# Patient Record
Sex: FEMALE | Race: ASIAN | NOT HISPANIC OR LATINO | URBAN - METROPOLITAN AREA
[De-identification: names, ages, dates, MRNs, and addresses within clinical notes are randomized per-mention and may not be internally consistent; named-entity substitution may affect disease eponyms.]

---

## 2021-07-08 ENCOUNTER — INPATIENT (INPATIENT)
Facility: HOSPITAL | Age: 41
LOS: 0 days | Discharge: HOME | End: 2021-07-09
Attending: INTERNAL MEDICINE | Admitting: INTERNAL MEDICINE
Payer: COMMERCIAL

## 2021-07-08 VITALS
DIASTOLIC BLOOD PRESSURE: 103 MMHG | RESPIRATION RATE: 17 BRPM | OXYGEN SATURATION: 99 % | HEIGHT: 63 IN | TEMPERATURE: 98 F | HEART RATE: 127 BPM | WEIGHT: 125 LBS | SYSTOLIC BLOOD PRESSURE: 157 MMHG

## 2021-07-08 LAB
ALBUMIN SERPL ELPH-MCNC: 4.5 G/DL — SIGNIFICANT CHANGE UP (ref 3.3–5)
ALP SERPL-CCNC: 126 U/L — HIGH (ref 40–120)
ALT FLD-CCNC: 38 U/L — SIGNIFICANT CHANGE UP (ref 12–78)
ANION GAP SERPL CALC-SCNC: 10 MMOL/L — SIGNIFICANT CHANGE UP (ref 5–17)
AST SERPL-CCNC: 19 U/L — SIGNIFICANT CHANGE UP (ref 15–37)
BASOPHILS # BLD AUTO: 0.03 K/UL — SIGNIFICANT CHANGE UP (ref 0–0.2)
BASOPHILS NFR BLD AUTO: 0.3 % — SIGNIFICANT CHANGE UP (ref 0–2)
BILIRUB SERPL-MCNC: 0.3 MG/DL — SIGNIFICANT CHANGE UP (ref 0.2–1.2)
BUN SERPL-MCNC: 9 MG/DL — SIGNIFICANT CHANGE UP (ref 7–23)
CALCIUM SERPL-MCNC: 9.2 MG/DL — SIGNIFICANT CHANGE UP (ref 8.5–10.1)
CHLORIDE SERPL-SCNC: 106 MMOL/L — SIGNIFICANT CHANGE UP (ref 96–108)
CO2 SERPL-SCNC: 24 MMOL/L — SIGNIFICANT CHANGE UP (ref 22–31)
CREAT SERPL-MCNC: 0.7 MG/DL — SIGNIFICANT CHANGE UP (ref 0.5–1.3)
D DIMER BLD IA.RAPID-MCNC: 200 NG/ML DDU — SIGNIFICANT CHANGE UP
EOSINOPHIL # BLD AUTO: 0.11 K/UL — SIGNIFICANT CHANGE UP (ref 0–0.5)
EOSINOPHIL NFR BLD AUTO: 1.1 % — SIGNIFICANT CHANGE UP (ref 0–6)
FLUAV AG NPH QL: SIGNIFICANT CHANGE UP
FLUBV AG NPH QL: SIGNIFICANT CHANGE UP
GLUCOSE SERPL-MCNC: 130 MG/DL — HIGH (ref 70–99)
HCG SERPL-ACNC: <1 MIU/ML — SIGNIFICANT CHANGE UP
HCT VFR BLD CALC: 43.3 % — SIGNIFICANT CHANGE UP (ref 34.5–45)
HGB BLD-MCNC: 13.4 G/DL — SIGNIFICANT CHANGE UP (ref 11.5–15.5)
IMM GRANULOCYTES NFR BLD AUTO: 0.2 % — SIGNIFICANT CHANGE UP (ref 0–1.5)
LYMPHOCYTES # BLD AUTO: 3.42 K/UL — HIGH (ref 1–3.3)
LYMPHOCYTES # BLD AUTO: 33 % — SIGNIFICANT CHANGE UP (ref 13–44)
MAGNESIUM SERPL-MCNC: 2.6 MG/DL — SIGNIFICANT CHANGE UP (ref 1.6–2.6)
MCHC RBC-ENTMCNC: 25.2 PG — LOW (ref 27–34)
MCHC RBC-ENTMCNC: 30.9 GM/DL — LOW (ref 32–36)
MCV RBC AUTO: 81.5 FL — SIGNIFICANT CHANGE UP (ref 80–100)
MONOCYTES # BLD AUTO: 0.73 K/UL — SIGNIFICANT CHANGE UP (ref 0–0.9)
MONOCYTES NFR BLD AUTO: 7 % — SIGNIFICANT CHANGE UP (ref 2–14)
NEUTROPHILS # BLD AUTO: 6.05 K/UL — SIGNIFICANT CHANGE UP (ref 1.8–7.4)
NEUTROPHILS NFR BLD AUTO: 58.4 % — SIGNIFICANT CHANGE UP (ref 43–77)
NRBC # BLD: 0 /100 WBCS — SIGNIFICANT CHANGE UP (ref 0–0)
PLATELET # BLD AUTO: 541 K/UL — HIGH (ref 150–400)
POTASSIUM SERPL-MCNC: 3.4 MMOL/L — LOW (ref 3.5–5.3)
POTASSIUM SERPL-SCNC: 3.4 MMOL/L — LOW (ref 3.5–5.3)
PROT SERPL-MCNC: 10.2 GM/DL — HIGH (ref 6–8.3)
RBC # BLD: 5.31 M/UL — HIGH (ref 3.8–5.2)
RBC # FLD: 15.6 % — HIGH (ref 10.3–14.5)
SARS-COV-2 RNA SPEC QL NAA+PROBE: SIGNIFICANT CHANGE UP
SODIUM SERPL-SCNC: 140 MMOL/L — SIGNIFICANT CHANGE UP (ref 135–145)
TROPONIN I SERPL-MCNC: <.015 NG/ML — SIGNIFICANT CHANGE UP (ref 0.01–0.04)
TSH SERPL-MCNC: 2.84 UU/ML — SIGNIFICANT CHANGE UP (ref 0.36–3.74)
WBC # BLD: 10.36 K/UL — SIGNIFICANT CHANGE UP (ref 3.8–10.5)
WBC # FLD AUTO: 10.36 K/UL — SIGNIFICANT CHANGE UP (ref 3.8–10.5)

## 2021-07-08 PROCEDURE — 99232 SBSQ HOSP IP/OBS MODERATE 35: CPT

## 2021-07-08 PROCEDURE — 99222 1ST HOSP IP/OBS MODERATE 55: CPT

## 2021-07-08 PROCEDURE — 93308 TTE F-UP OR LMTD: CPT | Mod: 26

## 2021-07-08 PROCEDURE — 99285 EMERGENCY DEPT VISIT HI MDM: CPT

## 2021-07-08 PROCEDURE — 99223 1ST HOSP IP/OBS HIGH 75: CPT | Mod: 25

## 2021-07-08 PROCEDURE — 93010 ELECTROCARDIOGRAM REPORT: CPT

## 2021-07-08 RX ORDER — SODIUM CHLORIDE 9 MG/ML
1000 INJECTION INTRAMUSCULAR; INTRAVENOUS; SUBCUTANEOUS ONCE
Refills: 0 | Status: COMPLETED | OUTPATIENT
Start: 2021-07-08 | End: 2021-07-08

## 2021-07-08 RX ORDER — METOPROLOL TARTRATE 50 MG
25 TABLET ORAL DAILY
Refills: 0 | Status: DISCONTINUED | OUTPATIENT
Start: 2021-07-08 | End: 2021-07-08

## 2021-07-08 RX ORDER — POTASSIUM CHLORIDE 20 MEQ
40 PACKET (EA) ORAL ONCE
Refills: 0 | Status: COMPLETED | OUTPATIENT
Start: 2021-07-08 | End: 2021-07-08

## 2021-07-08 RX ORDER — METOPROLOL TARTRATE 50 MG
5 TABLET ORAL ONCE
Refills: 0 | Status: COMPLETED | OUTPATIENT
Start: 2021-07-08 | End: 2021-07-08

## 2021-07-08 RX ORDER — METOPROLOL TARTRATE 50 MG
50 TABLET ORAL DAILY
Refills: 0 | Status: DISCONTINUED | OUTPATIENT
Start: 2021-07-08 | End: 2021-07-08

## 2021-07-08 RX ORDER — METOPROLOL TARTRATE 50 MG
50 TABLET ORAL ONCE
Refills: 0 | Status: COMPLETED | OUTPATIENT
Start: 2021-07-08 | End: 2021-07-08

## 2021-07-08 RX ORDER — METOPROLOL TARTRATE 50 MG
50 TABLET ORAL
Refills: 0 | Status: DISCONTINUED | OUTPATIENT
Start: 2021-07-08 | End: 2021-07-09

## 2021-07-08 RX ADMIN — SODIUM CHLORIDE 1000 MILLILITER(S): 9 INJECTION INTRAMUSCULAR; INTRAVENOUS; SUBCUTANEOUS at 14:01

## 2021-07-08 RX ADMIN — Medication 5 MILLIGRAM(S): at 14:04

## 2021-07-08 RX ADMIN — Medication 50 MILLIGRAM(S): at 16:49

## 2021-07-08 RX ADMIN — Medication 40 MILLIEQUIVALENT(S): at 14:01

## 2021-07-08 RX ADMIN — SODIUM CHLORIDE 1000 MILLILITER(S): 9 INJECTION INTRAMUSCULAR; INTRAVENOUS; SUBCUTANEOUS at 13:17

## 2021-07-08 NOTE — CONSULT NOTE ADULT - ASSESSMENT
40F HTN who presents with pSVT.    Short R-P tachycardia, likely AVNRT, precipitating factors include tachycardia. unclear on telemetry if episode preceded by PAC.  Strips with adenosine treatment not immediately available. unknown if truly adenosine-responsive.  No evidence of CHF on exam.    BP elevated, possibly 2/2 adrenergic tone in setting of SVT    -start metoprolol tartrate 50mg BID for rate control. we reviewed potential side effects.  -check TTE to eval for structural heart disease.  -we reviewed vagal maneuvers should symptoms recur. should medical management be insufficient or not well tolerated, we discussed undergoing evaluation for ablation.  -if no further recurrence in the next day she can be discharged from a cardiac standpoint      Naveed Gibson MD, FACC  , [facility] [employer] at [facility] [place] [facility]  [address] [place] [address], [place] [address]  office: [phone]

## 2021-07-08 NOTE — H&P ADULT - HISTORY OF PRESENT ILLNESS
40F    hx   HTN        p/w palpitations a few hours pta.        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 had  no sob       pt  went to . and   then ems  was  called , pt was   given adenosine iv 6mg for a HR of 180        pt denies  fevers,  abd pain, vomiting,  numbness      While in er, pt had  2  more episodes  of palpitations, hence  admitted  by Er

## 2021-07-08 NOTE — H&P ADULT - NSHPREVIEWOFSYSTEMS_GEN_ALL_CORE
REVIEW OF SYSTEMS:  GEN: no fever,    no chills  RESP: no SOB,   no cough  CVS: no chest pain,    palpitations  GI: no abdominal pain,   no nausea,   no vomiting,   no constipation,   no diarrhea  : no dysuria,   no frequency  NEURO: no headache,   no dizziness  PSYCH: no depression,   not anxious  Derm : no rash

## 2021-07-08 NOTE — ED ADULT NURSE NOTE - CHIEF COMPLAINT QUOTE
41 y/o female with PMH of HTN. BIBA with c/c of Palpitations. 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 PT was given 6mg of adenosine and broke in 135.

## 2021-07-08 NOTE — ED PROVIDER NOTE - CLINICAL SUMMARY MEDICAL DECISION MAKING FREE TEXT BOX
svt, broken with adenosine. check labs. rule out pe as she is  svt, broken with adenosine. check labs. rule out pe as she is   I read ekg as sinus tach rate 133, narrow qrs, normal axis, no st elevation or depression, qtc 437.

## 2021-07-08 NOTE — ED PROVIDER NOTE - OBJECTIVE STATEMENT
40F hx htn pw palpitations a few hours pta. 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 mild cp centrally, didn't radiate. no sob. called ems, given adenosine iv 6mg which broke her. ros neg for ha, vision loss, rhinorrhea, rash, bleeding, abd pain, vomiting, fever, chills, numbness, pregnancy, dysuria. feels fine now. 40F hx htn pw palpitations a few hours pta. 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 mild cp centrally, didn't radiate. no sob. went to . called ems, given adenosine iv 6mg for a HR of 180, which broke her. ros neg for ha, vision loss, rhinorrhea, rash, bleeding, abd pain, vomiting, fever, chills, numbness, pregnancy, dysuria. feels fine now.

## 2021-07-08 NOTE — H&P ADULT - ASSESSMENT
40 yr f,        hx   HTN        p/w palpitations a few hours pta.         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 had  no sob        pt  went to . and   then ems  was  called , pt was   given adenosine iv 6mg for a HR of 180        pt denies  fevers,  abd pain, vomiting,  numbness       While in er, pt had  2  more episodes  of palpitations, hence  admitted         *  s/p SVT          was  given adenosine  by ems          tele         echo/  card evtin bruno          *   HTN          on  toprol          labs/  tsh  in am   *    Hypokalemia            dvt ppx/   teds             40 yr f,        hx   HTN        p/w palpitations a few hours pta.         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 had  no sob        pt  went to . and   then ems  was  called , pt was   given adenosine iv 6mg for a HR of 180        pt denies  fevers,  abd pain, vomiting,  numbness       While in er, pt had  2  more episodes  of palpitations, hence  admitted         *  s/p SVT          was  given adenosine  by ems          tele  monitoring          troponin is normal/   d  dimer  is normal           and, tsh is normal         echo/  card eval dr bruno          *   HTN          on  toprol            *    Hypokalemia            dvt ppx/   teds             40 yr f,        hx   HTN        p/w palpitations a few hours pta.         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 had  no sob        pt  went to . and   then ems  was  called , pt was   given adenosine iv 6mg for a HR of 180        pt denies  fevers,  abd pain, vomiting,  numbness       While in er, pt had  2  more episodes  of palpitations, hence  admitted         *  s/p SVT/  AVNRT          was  given adenosine  by ems          tele  monitoring          troponin is normal/   d  dimer  is normal           and, tsh is normal         echo/  card eval dr bruno         on lopressor   bid,   *   HTN          on lopressor now/  stopped  norvasc            *    Hypokalemia            dvt ppx/   teds   plan.  change   to  toprol  XL  in  am  and  d/c  pt

## 2021-07-08 NOTE — H&P ADULT - NSHPPHYSICALEXAM_GEN_ALL_CORE
PHYSICAL EXAMINATION:  Vital Signs Last 24 Hrs  T(C): 36.9 (08 Jul 2021 12:42), Max: 36.9 (08 Jul 2021 12:42)  T(F): 98.5 (08 Jul 2021 12:42), Max: 98.5 (08 Jul 2021 12:42)  HR: 127 (08 Jul 2021 12:42) (127 - 127)  BP: 157/103 (08 Jul 2021 12:42) (157/103 - 157/103)  BP(mean): --  RR: 17 (08 Jul 2021 12:42) (17 - 17)  SpO2: 99% (08 Jul 2021 12:42) (99% - 99%)  CAPILLARY BLOOD GLUCOSE            GENERAL: NAD, well-groomed,  HEAD:  atraumatic, normocephalic  EYES: sclera anicteric  ENMT: mucous membranes moist  NECK: supple, No JVD  CHEST/LUNG: clear to auscultation bilaterally;    no      rales   ,   no rhonchi,   HEART: normal S1, S2  ABDOMEN: BS+, soft, ND, NT   EXTREMITIES:    no    edema    b/l LEs  NEURO: awake, ,     moves all extremities  SKIN: no     rash

## 2021-07-08 NOTE — ED ADULT TRIAGE NOTE - CHIEF COMPLAINT QUOTE
41 y/o female with PMH of HTN. BIBA with c/c of Palpitations. 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 PT was given 6mg of adenosine and broke in 135.

## 2021-07-08 NOTE — H&P ADULT - NSHPLABSRESULTS_GEN_ALL_CORE
LABS:                        13.4   10.36 )-----------( 541      ( 08 Jul 2021 13:08 )             43.3     07-08    140  |  106  |  9   ----------------------------<  130<H>  3.4<L>   |  24  |  0.70    Ca    9.2      08 Jul 2021 13:08  Mg     2.6     07-08    TPro  10.2<H>  /  Alb  4.5  /  TBili  0.3  /  DBili  x   /  AST  19  /  ALT  38  /  AlkPhos  126<H>  07-08      CARDIAC MARKERS ( 08 Jul 2021 13:08 )  <.015 ng/mL / x     / x     / x     / x                    Thyroid Stimulating Hormone, Serum: 2.840 uU/mL (07-08 @ 13:08)

## 2021-07-08 NOTE — ED ADULT NURSE NOTE - OBJECTIVE STATEMENT
40 year old female c/o palpitations starting today while driving. Patient denies any SOB or dizziness. Came in by ambulance whom gave her 6mg of adenosine IV for increased . PMH HTN

## 2021-07-08 NOTE — CONSULT NOTE ADULT - SUBJECTIVE AND OBJECTIVE BOX
Cardiology Initial Consult    [facility] [place]  [address] [place] [address], [place] [address]  Office: [phone]  Fax: [phone]    CHIEF COMPLAINT:      HISTORY OF PRESENT ILLNESS:  40yFemale    Allergies    No Known Allergies    Intolerances    	    MEDICATIONS:  metoprolol tartrate 50 milliGRAM(s) Oral once                  PAST MEDICAL & SURGICAL HISTORY:  HTN (hypertension)    No significant past surgical history        FAMILY HISTORY:      SOCIAL HISTORY:    [ ] Non-smoker  [ ] Smoker  [ ] Alcohol    Review of Systems:  Constitutional: [ ] Fever [ ] Chills [ ] Fatigue [ ] Weight change   HEENT: [ ] Blurred vision [ ] Eye pain [ ] Headache [ ] Runny nose [ ] Sore throat   Respiratory: [ ] Cough [ ] Wheezing [ ] Shortness of breath  Cardiovascular: [ ] Chest Pain [ ] Palpitations [ ] BHAT [ ] PND [ ] Orthopnea  Gastrointestinal: [ ] Abdominal Pain [ ] Diarrhea [ ] Constipation [ ] Hemorrhoids [ ] Nausea [ ] Vomiting  Genitourinary: [ ] Nocturia [ ] Dysuria [ ] Incontinence  Extremities: [ ] Swelling [ ] Joint Pain  Neurologic: [ ] Focal deficit [ ] Paresthesias [ ] Syncope  Skin: [ ] Rash [ ] Ecchymoses [ ] Wounds [ ] Lesions  Psychiatry: [ ] Depression [ ] Suicidal/Homicidal ideation [ ] Anxiety [ ] Sleep disturbances  [ ] 10 point review of systems is otherwise negative except as mentioned above            [ ]Unable to obtain    PHYSICAL EXAM:  T(C): 36.9 (07-08-21 @ 12:42), Max: 36.9 (07-08-21 @ 12:42)  HR: 127 (07-08-21 @ 12:42) (127 - 127)  BP: 157/103 (07-08-21 @ 12:42) (157/103 - 157/103)  RR: 17 (07-08-21 @ 12:42) (17 - 17)  SpO2: 99% (07-08-21 @ 12:42) (99% - 99%)  Wt(kg): --  I&O's Summary      Appearance: No acute distress  HEENT:   Normal oral mucosa, PERRL  Cardiovascular: Normal S1 S2, no elevated JVP, no murmurs, no edema  Respiratory: Lungs clear to auscultation	, good air movement  Psychiatry: A & O x 3, Mood & affect appropriate  Gastrointestinal:  soft nt nd normoactive bs	  Skin: No rashes, no ecchymoses, no cyanosis	  Neurologic: grossly non-focal  Extremities: Normal range of motion, no clubbing, cyanosis or edema  Vascular: Peripheral pulses palpable bilaterally    LABS:	 	  CBC Full  -  ( 08 Jul 2021 13:08 )  WBC Count : 10.36 K/uL  Hemoglobin : 13.4 g/dL  Hematocrit : 43.3 %  Platelet Count - Automated : 541 K/uL  Mean Cell Volume : 81.5 fl  Mean Cell Hemoglobin : 25.2 pg  Mean Cell Hemoglobin Concentration : 30.9 gm/dL  Auto Neutrophil # : 6.05 K/uL  Auto Lymphocyte # : 3.42 K/uL  Auto Monocyte # : 0.73 K/uL  Auto Eosinophil # : 0.11 K/uL  Auto Basophil # : 0.03 K/uL  Auto Neutrophil % : 58.4 %  Auto Lymphocyte % : 33.0 %  Auto Monocyte % : 7.0 %  Auto Eosinophil % : 1.1 %  Auto Basophil % : 0.3 %    07-08    140  |  106  |  9   ----------------------------<  130<H>  3.4<L>   |  24  |  0.70    Ca    9.2      08 Jul 2021 13:08  Mg     2.6     07-08    TPro  10.2<H>  /  Alb  4.5  /  TBili  0.3  /  DBili  x   /  AST  19  /  ALT  38  /  AlkPhos  126<H>  07-08      proBNP:   Lipid Profile:   HgA1c:   TSH: Thyroid Stimulating Hormone, Serum: 2.840 uU/mL (07-08 @ 13:08)      CARDIAC MARKERS:  Troponin I, Serum: <.015 ng/mL (07-08 @ 13:08)              TELEMETRY: 	    ECG:  	  RADIOLOGY:  OTHER: 	    PREVIOUS DIAGNOSTIC TESTING:    [ ] Echocardiogram:   [ ] Catheterization:   [ ] Stress Test:  	 Cardiology Initial Consult    [facility] [place]  [address] [place] [address], [place] [address]  Office: [phone]  Fax: [phone]    CHIEF COMPLAINT:  palpitations    HISTORY OF PRESENT ILLNESS:  40F HTN who experienced palpitations while seated after eating breakfast associated with chest pressure, ent to  and found to have SVT. Broke with adenosine.    Repeat episode in the ED, broke with vagal maneuvers. Received 5mg IV metoprolol.    Denies LOC, dizziness, visual disturbances, orthopnea, LE edema.  No clear inciting factor. Drinks tea in the AM.  No other sympathomimetics.  no etoh, tobacco, herbal supplements    Takes amlodipine for BP      Allergies  No Known Allergies    Intolerances      MEDICATIONS:  metoprolol tartrate 50 milliGRAM(s) Oral once          PAST MEDICAL & SURGICAL HISTORY:  HTN (hypertension)    No significant past surgical history        FAMILY HISTORY:  no premature CAD or SCD    SOCIAL HISTORY:    [x] Non-smoker  [ ] Smoker  [ ] Alcohol    Review of Systems:  Constitutional: [-] Fever [ -] Chills [ ] Fatigue [ ] Weight change   HEENT: [ ] Blurred vision [- ] Eye pain [- ] Headache [ ] Runny nose [ ] Sore throat   Respiratory: [ ] Cough [ ] Wheezing [- ] Shortness of breath  Cardiovascular: [x ] Chest Pain [x ] Palpitations [- ] BHAT [- ] PND [ -] Orthopnea  Gastrointestinal: [ ] Abdominal Pain [ ] Diarrhea [ ] Constipation [ ] Hemorrhoids [- ] Nausea [- ] Vomiting  Genitourinary: [ ] Nocturia [- ] Dysuria [ ] Incontinence  Extremities: [- ] Swelling [ ] Joint Pain  Neurologic: [ ] Focal deficit [ ] Paresthesias [- ] Syncope  Skin: [-] Rash [ ] Ecchymoses [ ] Wounds [ ] Lesions  Psychiatry: [ -] Depression [ ] Suicidal/Homicidal ideation [ ] Anxiety [ ] Sleep disturbances  [- ] 10 point review of systems is otherwise negative except as mentioned above            [ ]Unable to obtain    PHYSICAL EXAM:  T(C): 36.9 (07-08-21 @ 12:42), Max: 36.9 (07-08-21 @ 12:42)  HR: 127 (07-08-21 @ 12:42) (127 - 127)  BP: 157/103 (07-08-21 @ 12:42) (157/103 - 157/103)  RR: 17 (07-08-21 @ 12:42) (17 - 17)  SpO2: 99% (07-08-21 @ 12:42) (99% - 99%)  Wt(kg): --  I&O's Summary    Appearance: No acute distress  HEENT:   mmm  Cardiovascular: Normal S1 S2, tachycardic, no elevated JVP, no murmurs, no edema  Respiratory: Lungs clear to auscultation	, good air movement  Psychiatry: A & O x 3, Mood & affect appropriate  Gastrointestinal:  soft nt nd  Skin: No rashes, no ecchymoses, no cyanosis	  Neurologic: grossly non-focal  Extremities: Normal range of motion, no clubbing, cyanosis or edema  Vascular: Peripheral pulses palpable bilaterally    LABS:	 	  CBC Full  -  ( 08 Jul 2021 13:08 )  WBC Count : 10.36 K/uL  Hemoglobin : 13.4 g/dL  Hematocrit : 43.3 %  Platelet Count - Automated : 541 K/uL    07-08  140  |  106  |  9   ----------------------------<  130<H>  3.4<L>   |  24  |  0.70    Ca    9.2      08 Jul 2021 13:08  Mg     2.6     07-08    TPro  10.2<H>  /  Alb  4.5  /  TBili  0.3  /  DBili  x   /  AST  19  /  ALT  38  /  AlkPhos  126<H>  07-08      proBNP:   Lipid Profile:   HgA1c:   TSH: Thyroid Stimulating Hormone, Serum: 2.840 uU/mL (07-08 @ 13:08)      CARDIAC MARKERS:  Troponin I, Serum: <.015 ng/mL (07-08 @ 13:08)    TELEMETRY: 	  ST --> SVT  ECG:  	ST, no ischemic changes. EMS ECG with short-RP tachycardia, sub-mm ST depressions  RADIOLOGY:  OTHER: 	    PREVIOUS DIAGNOSTIC TESTING:    [ ] Echocardiogram:   [ ] Catheterization:   [ ] Stress Test:

## 2021-07-08 NOTE — ED PROVIDER NOTE - PHYSICAL EXAMINATION
Gen: Alert, NAD  Head: NC, AT   Eyes: PERRL, EOMI, normal lids/conjunctiva  ENT: normal hearing, patent oropharynx without erythema/exudate, uvula midline  Neck: supple, no tenderness, Trachea midline  Pulm: Bilateral BS, normal resp effort, no wheeze/stridor/retractions  CV: tachy no M/R/G, 2+ radial and dp pulses bl, no edema  Abd: soft, NT/ND, +BS, no hepatosplenomegaly  Mskel: extremities x4 with normal ROM and no joint effusions. no ctl spine ttp.   Skin: no rash, no bruising   Neuro: AAOx3, no sensory/motor deficits, CN 2-12 intact

## 2021-07-09 VITALS
OXYGEN SATURATION: 99 % | HEART RATE: 67 BPM | DIASTOLIC BLOOD PRESSURE: 70 MMHG | RESPIRATION RATE: 17 BRPM | TEMPERATURE: 97 F | SYSTOLIC BLOOD PRESSURE: 155 MMHG

## 2021-07-09 LAB
ANION GAP SERPL CALC-SCNC: 7 MMOL/L — SIGNIFICANT CHANGE UP (ref 5–17)
BUN SERPL-MCNC: 7 MG/DL — SIGNIFICANT CHANGE UP (ref 7–23)
CALCIUM SERPL-MCNC: 8.7 MG/DL — SIGNIFICANT CHANGE UP (ref 8.5–10.1)
CHLORIDE SERPL-SCNC: 108 MMOL/L — SIGNIFICANT CHANGE UP (ref 96–108)
CO2 SERPL-SCNC: 23 MMOL/L — SIGNIFICANT CHANGE UP (ref 22–31)
COVID-19 SPIKE DOMAIN AB INTERP: POSITIVE
COVID-19 SPIKE DOMAIN ANTIBODY RESULT: >250 U/ML — HIGH
CREAT SERPL-MCNC: 0.56 MG/DL — SIGNIFICANT CHANGE UP (ref 0.5–1.3)
GLUCOSE SERPL-MCNC: 128 MG/DL — HIGH (ref 70–99)
HCT VFR BLD CALC: 38.8 % — SIGNIFICANT CHANGE UP (ref 34.5–45)
HGB BLD-MCNC: 12 G/DL — SIGNIFICANT CHANGE UP (ref 11.5–15.5)
MCHC RBC-ENTMCNC: 25.1 PG — LOW (ref 27–34)
MCHC RBC-ENTMCNC: 30.9 GM/DL — LOW (ref 32–36)
MCV RBC AUTO: 81 FL — SIGNIFICANT CHANGE UP (ref 80–100)
NRBC # BLD: 0 /100 WBCS — SIGNIFICANT CHANGE UP (ref 0–0)
PLATELET # BLD AUTO: 521 K/UL — HIGH (ref 150–400)
POTASSIUM SERPL-MCNC: 3.7 MMOL/L — SIGNIFICANT CHANGE UP (ref 3.5–5.3)
POTASSIUM SERPL-SCNC: 3.7 MMOL/L — SIGNIFICANT CHANGE UP (ref 3.5–5.3)
PROT SERPL-MCNC: 8.2 G/DL — SIGNIFICANT CHANGE UP (ref 6–8.3)
PROT SERPL-MCNC: 8.2 G/DL — SIGNIFICANT CHANGE UP (ref 6–8.3)
RBC # BLD: 4.79 M/UL — SIGNIFICANT CHANGE UP (ref 3.8–5.2)
RBC # FLD: 15.9 % — HIGH (ref 10.3–14.5)
SARS-COV-2 IGG+IGM SERPL QL IA: >250 U/ML — HIGH
SARS-COV-2 IGG+IGM SERPL QL IA: POSITIVE
SODIUM SERPL-SCNC: 138 MMOL/L — SIGNIFICANT CHANGE UP (ref 135–145)
WBC # BLD: 10.89 K/UL — HIGH (ref 3.8–10.5)
WBC # FLD AUTO: 10.89 K/UL — HIGH (ref 3.8–10.5)

## 2021-07-09 PROCEDURE — 99239 HOSP IP/OBS DSCHRG MGMT >30: CPT

## 2021-07-09 PROCEDURE — 99232 SBSQ HOSP IP/OBS MODERATE 35: CPT

## 2021-07-09 RX ORDER — METOPROLOL TARTRATE 50 MG
1 TABLET ORAL
Qty: 60 | Refills: 0
Start: 2021-07-09 | End: 2021-08-07

## 2021-07-09 RX ADMIN — Medication 50 MILLIGRAM(S): at 05:42

## 2021-07-09 NOTE — DISCHARGE NOTE PROVIDER - CARE PROVIDER_API CALL
pcp,   Phone: (   )    -  Fax: (   )    -  Follow Up Time:     Naveed Gibson (MD)  Cardiovascular Disease; Internal Medicine  [address] [id number]  Phone: [phone]  Fax: [phone]  Follow Up Time:

## 2021-07-09 NOTE — DISCHARGE NOTE PROVIDER - NSDCCPCAREPLAN_GEN_ALL_CORE_FT
PRINCIPAL DISCHARGE DIAGNOSIS  Diagnosis: SVT (supraventricular tachycardia)  Assessment and Plan of Treatment: -Cont metoprolol tartrate 50mg BID for rate control  -TTE unremarkable  -Reviewed vagal maneuvers by cardio should symptoms recur, should medical management be insufficient or not well tolerated, would consider undergoing evaluation for ablation as outpatient   -no further recurrence since admission, cardio cleared for discharge.

## 2021-07-09 NOTE — DISCHARGE NOTE PROVIDER - HOSPITAL COURSE
40F HTN who presents with pSVT.    Short R-P tachycardia, likely AVNRT, precipitating factors include tachycardia. unclear on telemetry if episode preceded by PAC.  Strips with adenosine treatment not immediately available. unknown if truly adenosine-responsive.  No evidence of CHF on exam.    BP was elevated, possibly 2/2 adrenergic tone in setting of SVT, now improved     -Cont metoprolol tartrate 50mg BID for rate control  -TTE unremarkable  -Reviewed vagal maneuvers by cardio should symptoms recur, should medical management be insufficient or not well tolerated, would consider undergoing evaluation for ablation as outpatient   -no further recurrence since admission, cardio cleared for discharge.

## 2021-07-09 NOTE — PROGRESS NOTE ADULT - ASSESSMENT
40F HTN who presents with pSVT.    Short R-P tachycardia, likely AVNRT, precipitating factors include tachycardia. unclear on telemetry if episode preceded by PAC.  Strips with adenosine treatment not immediately available. unknown if truly adenosine-responsive.  No evidence of CHF on exam.    BP elevated, possibly 2/2 adrenergic tone in setting of SVT    -Cont metoprolol tartrate 50mg BID for rate control. we reviewed potential side effects.  -TTE unremarkable  -Reviewed vagal maneuvers should symptoms recur. should medical management be insufficient or not well tolerated, to consider undergoing evaluation for ablation.  -no further recurrence since admission, she can be discharged from a cardiac standpoint   40F HTN who presents with pSVT.    Short R-P tachycardia, likely AVNRT, precipitating factors include tachycardia. unclear on telemetry if episode preceded by PAC.  Strips with adenosine treatment not immediately available. unknown if truly adenosine-responsive.  No evidence of CHF on exam.    BP elevated, possibly 2/2 adrenergic tone in setting of SVT    -Cont metoprolol tartrate 50mg BID for rate control,  reviewed potential side effects  -TTE unremarkable  -Reviewed vagal maneuvers should symptoms recur, should medical management be insufficient or not well tolerated, would consider undergoing evaluation for ablation as outpatient   -no further recurrence since admission, she can be discharged from a cardiac standpoint

## 2021-07-09 NOTE — PROGRESS NOTE ADULT - ATTENDING COMMENTS
feels better  tele no events  TTE wnl  okay to discharge from cardiac standpoint for outpt follow-up of likely AVNRT

## 2021-07-09 NOTE — DISCHARGE NOTE NURSING/CASE MANAGEMENT/SOCIAL WORK - PATIENT PORTAL LINK FT
You can access the FollowMyHealth Patient Portal offered by [facility] by registering at the following website: [email or web address]. By joining [employer]’s FollowMyHealth portal, you will also be able to view your health information using other applications (apps) compatible with our system.

## 2021-07-16 LAB
% ALBUMIN: 53 % — SIGNIFICANT CHANGE UP
% ALPHA 1: 4 % — SIGNIFICANT CHANGE UP
% ALPHA 2: 10.5 % — SIGNIFICANT CHANGE UP
% BETA: 14.7 % — SIGNIFICANT CHANGE UP
% GAMMA: 17.8 % — SIGNIFICANT CHANGE UP
ALBUMIN SERPL ELPH-MCNC: 4.3 G/DL — SIGNIFICANT CHANGE UP (ref 3.6–5.5)
ALBUMIN/GLOB SERPL ELPH: 1.1 RATIO — SIGNIFICANT CHANGE UP
ALPHA1 GLOB SERPL ELPH-MCNC: 0.3 G/DL — SIGNIFICANT CHANGE UP (ref 0.1–0.4)
ALPHA2 GLOB SERPL ELPH-MCNC: 0.9 G/DL — SIGNIFICANT CHANGE UP (ref 0.5–1)
B-GLOBULIN SERPL ELPH-MCNC: 1.2 G/DL — HIGH (ref 0.5–1)
GAMMA GLOBULIN: 1.5 G/DL — SIGNIFICANT CHANGE UP (ref 0.6–1.6)
INTERPRETATION SERPL IFE-IMP: SIGNIFICANT CHANGE UP
PROT PATTERN SERPL ELPH-IMP: SIGNIFICANT CHANGE UP

## 2021-07-19 DIAGNOSIS — E87.6 HYPOKALEMIA: ICD-10-CM

## 2021-07-19 DIAGNOSIS — I10 ESSENTIAL (PRIMARY) HYPERTENSION: ICD-10-CM

## 2021-07-19 DIAGNOSIS — I47.1 SUPRAVENTRICULAR TACHYCARDIA: ICD-10-CM

## 2021-10-14 ENCOUNTER — EMERGENCY (EMERGENCY)
Facility: HOSPITAL | Age: 41
LOS: 0 days | Discharge: HOME | End: 2021-10-14
Attending: EMERGENCY MEDICINE
Payer: COMMERCIAL

## 2021-10-14 VITALS
HEIGHT: 58 IN | HEART RATE: 113 BPM | TEMPERATURE: 98 F | OXYGEN SATURATION: 99 % | WEIGHT: 123.02 LBS | RESPIRATION RATE: 19 BRPM | SYSTOLIC BLOOD PRESSURE: 146 MMHG | DIASTOLIC BLOOD PRESSURE: 98 MMHG

## 2021-10-14 VITALS
OXYGEN SATURATION: 99 % | HEART RATE: 89 BPM | RESPIRATION RATE: 18 BRPM | TEMPERATURE: 98 F | SYSTOLIC BLOOD PRESSURE: 133 MMHG | DIASTOLIC BLOOD PRESSURE: 88 MMHG

## 2021-10-14 DIAGNOSIS — R00.2 PALPITATIONS: ICD-10-CM

## 2021-10-14 DIAGNOSIS — R94.31 ABNORMAL ELECTROCARDIOGRAM [ECG] [EKG]: ICD-10-CM

## 2021-10-14 DIAGNOSIS — F41.9 ANXIETY DISORDER, UNSPECIFIED: ICD-10-CM

## 2021-10-14 DIAGNOSIS — G47.00 INSOMNIA, UNSPECIFIED: ICD-10-CM

## 2021-10-14 DIAGNOSIS — I10 ESSENTIAL (PRIMARY) HYPERTENSION: ICD-10-CM

## 2021-10-14 DIAGNOSIS — R00.0 TACHYCARDIA, UNSPECIFIED: ICD-10-CM

## 2021-10-14 LAB
ALBUMIN SERPL ELPH-MCNC: 3.9 G/DL — SIGNIFICANT CHANGE UP (ref 3.3–5)
ALP SERPL-CCNC: 106 U/L — SIGNIFICANT CHANGE UP (ref 40–120)
ALT FLD-CCNC: 35 U/L — SIGNIFICANT CHANGE UP (ref 12–78)
ANION GAP SERPL CALC-SCNC: 8 MMOL/L — SIGNIFICANT CHANGE UP (ref 5–17)
APAP SERPL-MCNC: < 2 UG/ML (ref 10–30)
AST SERPL-CCNC: 22 U/L — SIGNIFICANT CHANGE UP (ref 15–37)
BASOPHILS # BLD AUTO: 0.03 K/UL — SIGNIFICANT CHANGE UP (ref 0–0.2)
BASOPHILS NFR BLD AUTO: 0.3 % — SIGNIFICANT CHANGE UP (ref 0–2)
BILIRUB SERPL-MCNC: 0.6 MG/DL — SIGNIFICANT CHANGE UP (ref 0.2–1.2)
BUN SERPL-MCNC: 10 MG/DL — SIGNIFICANT CHANGE UP (ref 7–23)
CALCIUM SERPL-MCNC: 9.5 MG/DL — SIGNIFICANT CHANGE UP (ref 8.5–10.1)
CHLORIDE SERPL-SCNC: 107 MMOL/L — SIGNIFICANT CHANGE UP (ref 96–108)
CO2 SERPL-SCNC: 21 MMOL/L — LOW (ref 22–31)
CREAT SERPL-MCNC: 0.69 MG/DL — SIGNIFICANT CHANGE UP (ref 0.5–1.3)
EOSINOPHIL # BLD AUTO: 0.02 K/UL — SIGNIFICANT CHANGE UP (ref 0–0.5)
EOSINOPHIL NFR BLD AUTO: 0.2 % — SIGNIFICANT CHANGE UP (ref 0–6)
ETHANOL SERPL-MCNC: <10 MG/DL — SIGNIFICANT CHANGE UP (ref 0–10)
FLUAV AG NPH QL: SIGNIFICANT CHANGE UP
FLUBV AG NPH QL: SIGNIFICANT CHANGE UP
GLUCOSE SERPL-MCNC: 92 MG/DL — SIGNIFICANT CHANGE UP (ref 70–99)
HCT VFR BLD CALC: 41.1 % — SIGNIFICANT CHANGE UP (ref 34.5–45)
HGB BLD-MCNC: 13 G/DL — SIGNIFICANT CHANGE UP (ref 11.5–15.5)
IMM GRANULOCYTES NFR BLD AUTO: 0.3 % — SIGNIFICANT CHANGE UP (ref 0–1.5)
LYMPHOCYTES # BLD AUTO: 19.5 % — SIGNIFICANT CHANGE UP (ref 13–44)
LYMPHOCYTES # BLD AUTO: 2.32 K/UL — SIGNIFICANT CHANGE UP (ref 1–3.3)
MAGNESIUM SERPL-MCNC: 2.9 MG/DL — HIGH (ref 1.6–2.6)
MCHC RBC-ENTMCNC: 25.9 PG — LOW (ref 27–34)
MCHC RBC-ENTMCNC: 31.6 GM/DL — LOW (ref 32–36)
MCV RBC AUTO: 82 FL — SIGNIFICANT CHANGE UP (ref 80–100)
MONOCYTES # BLD AUTO: 0.55 K/UL — SIGNIFICANT CHANGE UP (ref 0–0.9)
MONOCYTES NFR BLD AUTO: 4.6 % — SIGNIFICANT CHANGE UP (ref 2–14)
NEUTROPHILS # BLD AUTO: 8.93 K/UL — HIGH (ref 1.8–7.4)
NEUTROPHILS NFR BLD AUTO: 75.1 % — SIGNIFICANT CHANGE UP (ref 43–77)
NRBC # BLD: 0 /100 WBCS — SIGNIFICANT CHANGE UP (ref 0–0)
PLATELET # BLD AUTO: 400 K/UL — SIGNIFICANT CHANGE UP (ref 150–400)
POTASSIUM SERPL-MCNC: 4.1 MMOL/L — SIGNIFICANT CHANGE UP (ref 3.5–5.3)
POTASSIUM SERPL-SCNC: 4.1 MMOL/L — SIGNIFICANT CHANGE UP (ref 3.5–5.3)
PROT SERPL-MCNC: 8.9 GM/DL — HIGH (ref 6–8.3)
RBC # BLD: 5.01 M/UL — SIGNIFICANT CHANGE UP (ref 3.8–5.2)
RBC # FLD: 15.4 % — HIGH (ref 10.3–14.5)
SALICYLATES SERPL-MCNC: <1.7 MG/DL — LOW (ref 2.8–20)
SARS-COV-2 RNA SPEC QL NAA+PROBE: SIGNIFICANT CHANGE UP
SODIUM SERPL-SCNC: 136 MMOL/L — SIGNIFICANT CHANGE UP (ref 135–145)
TROPONIN I SERPL-MCNC: <.015 NG/ML — SIGNIFICANT CHANGE UP (ref 0.01–0.04)
TSH SERPL-MCNC: 3.9 UIU/ML — HIGH (ref 0.36–3.74)
WBC # BLD: 11.88 K/UL — HIGH (ref 3.8–10.5)
WBC # FLD AUTO: 11.88 K/UL — HIGH (ref 3.8–10.5)

## 2021-10-14 PROCEDURE — 71045 X-RAY EXAM CHEST 1 VIEW: CPT | Mod: 26

## 2021-10-14 PROCEDURE — 93010 ELECTROCARDIOGRAM REPORT: CPT

## 2021-10-14 PROCEDURE — 99285 EMERGENCY DEPT VISIT HI MDM: CPT

## 2021-10-14 RX ORDER — METOPROLOL TARTRATE 50 MG
25 TABLET ORAL ONCE
Refills: 0 | Status: COMPLETED | OUTPATIENT
Start: 2021-10-14 | End: 2021-10-14

## 2021-10-14 RX ORDER — METOCLOPRAMIDE HCL 10 MG
10 TABLET ORAL ONCE
Refills: 0 | Status: COMPLETED | OUTPATIENT
Start: 2021-10-14 | End: 2021-10-14

## 2021-10-14 RX ORDER — ZOLPIDEM TARTRATE 10 MG/1
1 TABLET ORAL
Qty: 3 | Refills: 0
Start: 2021-10-14 | End: 2021-10-16

## 2021-10-14 RX ORDER — ALPRAZOLAM 0.25 MG
0.25 TABLET ORAL ONCE
Refills: 0 | Status: DISCONTINUED | OUTPATIENT
Start: 2021-10-14 | End: 2021-10-14

## 2021-10-14 RX ADMIN — Medication 10 MILLIGRAM(S): at 10:56

## 2021-10-14 RX ADMIN — Medication 25 MILLIGRAM(S): at 10:56

## 2021-10-14 RX ADMIN — Medication 0.25 MILLIGRAM(S): at 10:56

## 2021-10-14 NOTE — ED PROVIDER NOTE - MUSCULOSKELETAL, MLM
Spine appears normal, range of motion is not limited, no muscle or joint tenderness Nontender to palp bilateral legs

## 2021-10-14 NOTE — ED PROVIDER NOTE - PROGRESS NOTE DETAILS
pt is smiling sts she is feeling much better now hr 87 bp 114/60 rr 14 pox 99% in room air. RN just sent comprehensive blood for the 3rd time now. due to repeating hemolyze. Pt is smiling sts pt has no more palpitations pt also denies headache, dizziness, blurred visions, light sensitivities, focal/distal weakness or numbness, sob, chest pain, nausea, vomiting abd pain. Pt is given and explained all test report and advised to follow up with pmd and return if symptoms persist or worsen.

## 2021-10-14 NOTE — ED PROVIDER NOTE - PATIENT PORTAL LINK FT
You can access the FollowMyHealth Patient Portal offered by [facility] by registering at the following website: [email or web address]. By joining [employer]’s FollowMyHealth portal, you will also be able to view your health information using other applications (apps) compatible with our system.

## 2021-10-14 NOTE — ED PROVIDER NOTE - OBJECTIVE STATEMENT
41 years old female here with daughter c/o palpitations elevated bp and feeling anxious since last night and she also could not sleep kept checking her bp. Pt however did not take her Metoprolol 25 mg bid this morning. Pt had [employer] in July of this year. Pt denies depression harmful thoughts to herself or others, visual/auditory hallucinations, headache, dizziness, blurred visions, light sensitivities, focal/distal weakness or numbness, neck/back/calfs pain, cough, sob, chest pain, nausea, vomiting, fever, chills, abd pain, dysuria, vagina spotting or discharge, or irregular bowel movements. Cardiac

## 2021-10-14 NOTE — ED PROVIDER NOTE - CONSTITUTIONAL, MLM
normal... Well appearing, awake, alert, oriented to person, place, time/situation and in no apparent distress. Speaking in clear full sentences no nasal flaring no shoulders retractions no diaphoresis, not holding her head/chest/abdomen, smiling appears very comfortable lying in the stretcher in a bright light room

## 2021-10-14 NOTE — ED ADULT NURSE NOTE - OBJECTIVE STATEMENT
Pt reports anxiety and elevated BP over the past few days. Pt states shes compliant with her BP meds and monitors Bp at home

## 2021-10-14 NOTE — ED ADULT TRIAGE NOTE - CHIEF COMPLAINT QUOTE
Past 3 days has been anxious and checking her BP every hour  HX: was on Amlodipine and now on Metoprolol since July

## 2022-04-06 NOTE — PROGRESS NOTE ADULT - SUBJECTIVE AND OBJECTIVE BOX
Patient is a 40y old  Female who presents with a chief complaint of palpitations (08 Jul 2021 15:08)    PAST MEDICAL & SURGICAL HISTORY:  HTN (hypertension)    No significant past surgical history    INTERVAL HISTORY:  	  MEDICATIONS:  MEDICATIONS  (STANDING):  metoprolol tartrate 50 milliGRAM(s) Oral two times a day    Vitals:  T(F): 97.4 (07-09-21 @ 11:00), Max: 98.5 (07-08-21 @ 12:42)  HR: 67 (07-09-21 @ 11:00) (67 - 127)  BP: 155/70 (07-09-21 @ 11:00) (127/82 - 157/103)  RR: 17 (07-09-21 @ 11:00) (17 - 18)  SpO2: 99% (07-09-21 @ 11:00) (99% - 100%)    Weight (kg): 56.5 (07-08 @ 18:05)  BMI (kg/m2): 22.1 (07-08 @ 18:05)    PHYSICAL EXAM:  Neuro: Awake, responsive  CV: S1 S2 RRR  Lungs: CTABL  GI: Soft, BS +, ND, NT  Extremities: No edema    TELEMETRY: RSR    DIAGNOSTIC TESTING:    [x ] Echocardiogram: < from: TTE Echo Complete w/o Contrast w/ Doppler (07.08.21 @ 20:24) >  Left Ventricle: The left ventricle was not well visualized. The left ventricular internal cavity size is normal. Increased relative wall thickness with normal mass index consistent with left ventricular concentric remodeling.  Global LV systolic function was normal. Left ventricular ejection fraction, by visual estimation, is 55 to 60%. Spectral Doppler shows normal pattern of LV diastolic filling.  Right Ventricle: Normal right ventricular size and function.  Left Atrium: The left atrium is normal in size.  Right Atrium: The right atrium is normal in size.  Pericardium: There is no evidence of pericardial effusion.  Mitral Valve: Structurally normal mitral valve, with normal leaflet excursion. Trace mitral valve regurgitation is seen.  Tricuspid Valve: Structurally normal tricuspid valve, with normal leaflet excursion. Trivial tricuspid regurgitation is visualized.  Aortic Valve: The aortic valve was not well visualized. The aortic valve mean gradient is 5.2 mmHg consistent with normally opening aortic valve. No evidence of aortic valve regurgitation is seen.  Pulmonic Valve: The pulmonic valve was not well visualized. No indication of pulmonic valve regurgitation.  Aorta: The aortic root is normal in size and structure. Aorticroot measured at Sinus of Valsalva is normal.      Summary:   1. Left ventricular ejection fraction, by visual estimation, is 55 to 60%.   2. Normal global left ventricular systolic function.   3. Normal right ventricular size and function.   4. The left atrium is normal in size.   5. Structurally normal mitral valve, with normal leaflet excursion.   6. Increased relative wall thickness with normal mass index consistent with left ventricular concentric remodeling.   7. The aortic valve mean gradient is 5.2 mmHg consistent with normally opening aortic valve    < end of copied text >    LABS:	 	    CARDIAC MARKERS:  Troponin I, Serum: <.015 ng/mL (07-08 @ 13:08)    09 Jul 2021 08:07    138    |  108    |  7      ----------------------------<  128    3.7     |  23     |  0.56   08 Jul 2021 13:08    140    |  106    |  9      ----------------------------<  130    3.4     |  24     |  0.70     Ca    8.7        09 Jul 2021 08:07  Mg     2.6       08 Jul 2021 13:08    TPro  10.2   /  Alb  4.5    /  TBili  0.3    /  DBili  x      /  AST  19     /  ALT  38     /  AlkPhos  126    08 Jul 2021 13:08                          12.0   10.89 )-----------( 521      ( 09 Jul 2021 08:07 )             38.8 ,                       13.4   10.36 )-----------( 541      ( 08 Jul 2021 13:08 )             43.3     TSH: Thyroid Stimulating Hormone, Serum: 2.840 uU/mL (07-08 @ 13:08)                 Patient is a 40y old  Female who presents with a chief complaint of palpitations (08 Jul 2021 15:08)    PAST MEDICAL & SURGICAL HISTORY:  HTN (hypertension)    No significant past surgical history    INTERVAL HISTORY: No further tachycardia, denies any palpitation   	  MEDICATIONS:  MEDICATIONS  (STANDING):  metoprolol tartrate 50 milliGRAM(s) Oral two times a day    Vitals:  T(F): 97.4 (07-09-21 @ 11:00), Max: 98.5 (07-08-21 @ 12:42)  HR: 67 (07-09-21 @ 11:00) (67 - 127)  BP: 155/70 (07-09-21 @ 11:00) (127/82 - 157/103)  RR: 17 (07-09-21 @ 11:00) (17 - 18)  SpO2: 99% (07-09-21 @ 11:00) (99% - 100%)    Weight (kg): 56.5 (07-08 @ 18:05)  BMI (kg/m2): 22.1 (07-08 @ 18:05)    PHYSICAL EXAM:  Neuro: Awake, responsive  CV: S1 S2 RRR  Lungs: CTABL  GI: Soft, BS +, ND, NT  Extremities: No edema    TELEMETRY: RSR    DIAGNOSTIC TESTING:    [x ] Echocardiogram: < from: TTE Echo Complete w/o Contrast w/ Doppler (07.08.21 @ 20:24) >  Left Ventricle: The left ventricle was not well visualized. The left ventricular internal cavity size is normal. Increased relative wall thickness with normal mass index consistent with left ventricular concentric remodeling.  Global LV systolic function was normal. Left ventricular ejection fraction, by visual estimation, is 55 to 60%. Spectral Doppler shows normal pattern of LV diastolic filling.  Right Ventricle: Normal right ventricular size and function.  Left Atrium: The left atrium is normal in size.  Right Atrium: The right atrium is normal in size.  Pericardium: There is no evidence of pericardial effusion.  Mitral Valve: Structurally normal mitral valve, with normal leaflet excursion. Trace mitral valve regurgitation is seen.  Tricuspid Valve: Structurally normal tricuspid valve, with normal leaflet excursion. Trivial tricuspid regurgitation is visualized.  Aortic Valve: The aortic valve was not well visualized. The aortic valve mean gradient is 5.2 mmHg consistent with normally opening aortic valve. No evidence of aortic valve regurgitation is seen.  Pulmonic Valve: The pulmonic valve was not well visualized. No indication of pulmonic valve regurgitation.  Aorta: The aortic root is normal in size and structure. Aorticroot measured at Sinus of Valsalva is normal.      Summary:   1. Left ventricular ejection fraction, by visual estimation, is 55 to 60%.   2. Normal global left ventricular systolic function.   3. Normal right ventricular size and function.   4. The left atrium is normal in size.   5. Structurally normal mitral valve, with normal leaflet excursion.   6. Increased relative wall thickness with normal mass index consistent with left ventricular concentric remodeling.   7. The aortic valve mean gradient is 5.2 mmHg consistent with normally opening aortic valve    < end of copied text >    LABS:	 	    CARDIAC MARKERS:  Troponin I, Serum: <.015 ng/mL (07-08 @ 13:08)    09 Jul 2021 08:07    138    |  108    |  7      ----------------------------<  128    3.7     |  23     |  0.56   08 Jul 2021 13:08    140    |  106    |  9      ----------------------------<  130    3.4     |  24     |  0.70     Ca    8.7        09 Jul 2021 08:07  Mg     2.6       08 Jul 2021 13:08    TPro  10.2   /  Alb  4.5    /  TBili  0.3    /  DBili  x      /  AST  19     /  ALT  38     /  AlkPhos  126    08 Jul 2021 13:08                          12.0   10.89 )-----------( 521      ( 09 Jul 2021 08:07 )             38.8 ,                       13.4   10.36 )-----------( 541      ( 08 Jul 2021 13:08 )             43.3     TSH: Thyroid Stimulating Hormone, Serum: 2.840 uU/mL (07-08 @ 13:08)                 Plastic Surgeon Procedure Text (A): After obtaining clear surgical margins the patient was sent to plastics for surgical repair.  The patient understands they will receive post-surgical care and follow-up from the referring physician's office.

## 2023-09-01 NOTE — ED PROVIDER NOTE - NEURO NEGATIVE STATEMENT, MLM
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
no loss of consciousness, no gait abnormality, no headache, no sensory deficits, and no weakness.
